# Patient Record
Sex: FEMALE | Race: BLACK OR AFRICAN AMERICAN | Employment: FULL TIME | URBAN - METROPOLITAN AREA
[De-identification: names, ages, dates, MRNs, and addresses within clinical notes are randomized per-mention and may not be internally consistent; named-entity substitution may affect disease eponyms.]

---

## 2020-03-13 ENCOUNTER — OFFICE VISIT (OUTPATIENT)
Dept: FAMILY MEDICINE CLINIC | Age: 30
End: 2020-03-13

## 2020-03-13 VITALS
BODY MASS INDEX: 19.92 KG/M2 | HEIGHT: 67 IN | HEART RATE: 79 BPM | RESPIRATION RATE: 20 BRPM | TEMPERATURE: 98.1 F | WEIGHT: 126.9 LBS | OXYGEN SATURATION: 98 % | SYSTOLIC BLOOD PRESSURE: 110 MMHG | DIASTOLIC BLOOD PRESSURE: 70 MMHG

## 2020-03-13 DIAGNOSIS — R00.2 HEART PALPITATIONS: ICD-10-CM

## 2020-03-13 DIAGNOSIS — E78.00 HYPERCHOLESTEROLEMIA: ICD-10-CM

## 2020-03-13 DIAGNOSIS — Z00.00 WELL WOMAN EXAM (NO GYNECOLOGICAL EXAM): Primary | ICD-10-CM

## 2020-03-13 NOTE — PROGRESS NOTES
Subjective:   30 y.o. female for Well Woman Check. Her gyne and breast care is done elsewhere by her Ob-Gyne physician. Current Outpatient Medications   Medication Sig Dispense Refill    OTHER Birth Control Pills patient unsure of name-dose-.      norethindrone-ethinyl estradiol (CYCLAFEM 1/35, 28,) 1-35 mg-mcg per tablet Take  by mouth. No Known Allergies  History reviewed. No pertinent past medical history. Past Surgical History:   Procedure Laterality Date    HX CORNEAL TRANSPLANT      HX GYN  2009    D&C 2009    HX HEENT  [id number]    corneal transplant (3)  2005     Family History   Problem Relation Age of Onset    Cancer Maternal Grandmother     Cancer Maternal Grandfather      Social History     Tobacco Use    Smoking status: Never Smoker    Smokeless tobacco: Never Used   Substance Use Topics    Alcohol use: Yes     Comment: rarely        Lab Results   Component Value Date/Time    WBC 6.5 01/10/2014 01:14 PM    HGB 13.1 01/10/2014 01:14 PM    HCT 39.3 01/10/2014 01:14 PM    PLATELET 708 15/68/1053 01:14 PM    MCV 93 01/10/2014 01:14 PM     Lab Results   Component Value Date/Time    Glucose 74 01/10/2014 01:14 PM    LDL, calculated 107 (H) 01/10/2014 01:14 PM    Creatinine 0.97 01/10/2014 01:14 PM      Lab Results   Component Value Date/Time    Cholesterol, total 188 01/10/2014 01:14 PM    HDL Cholesterol 71 01/10/2014 01:14 PM    LDL, calculated 107 (H) 01/10/2014 01:14 PM    Triglyceride 48 01/10/2014 01:14 PM            Constitutional: no chills and fever,   nad     HENT: no ear head pain and nosebleeds. No blurred vision, pain and discharge. Respiratory: no shortness of breath, wheezing cough nor sore throat. Cardiovascular: Has no chest pain, leg swelling , w/+++ racing heart . Gastrointestinal: No constipation, diarrhea, nausea and vomiting. Genitourinary: No frequency. Musculoskeletal: Negative for joint pain. Skin: no itching, pimples or acne rash. Neurological: Negative for dizziness, no tremors  Psychiatric/Behavioral: Negative for depression has normal interest to do things and not depressed the patient is not nervous/anxious. Specific concerns today:   Palaptiation, no hx of it last few sec, goes away not dizzy no hx of heart dz     Objective: The patient appears well, alert, oriented x 3, in no distress. Visit Vitals  /70 (BP 1 Location: Left arm, BP Patient Position: At rest)   Pulse 79   Temp 98.1 °F (36.7 °C) (Oral)   Resp 20   Ht 5' 7.13\" (1.705 m)   Wt 126 lb 14.4 oz (57.6 kg)   LMP 02/13/2020   SpO2 98%   BMI 19.80 kg/m²     ENT normal.  Neck supple. No adenopathy or thyromegaly. OMI. Lungs are clear, good air entry, no wheezes, rhonchi or rales. S1 and S2 normal, no murmurs, regular rate and rhythm. Abdomen soft without tenderness, guarding, mass or organomegaly. Extremities show no edema, normal peripheral pulses. Neurological is normal, no focal findings. Breast and Pelvic exams are deferred. Assessment/Plan:   Well Woman  lose weight, increase physical activity, follow low fat diet, follow low salt diet, routine labs ordered  Diagnoses and all orders for this visit:    1. Well woman exam (no gynecological exam)  Comments:  [V70.0]  Orders:  -     REFERRAL TO CARDIOLOGY  -     CBC W/O DIFF  -     METABOLIC PANEL, COMPREHENSIVE  -     VITAMIN D, 25 HYDROXY  -     VITAMIN B12 & FOLATE  -     TSH 3RD GENERATION  -     FERRITIN  -     AMB POC URINALYSIS DIP STICK AUTO W/O MICRO  -     HDL CHOLESTEROL  -     CHOLESTEROL, TOTAL    2. Heart palpitations  -     REFERRAL TO CARDIOLOGY    3.  Hypercholesterolemia  -     TSH 3RD GENERATION  -     HDL CHOLESTEROL  -     CHOLESTEROL, TOTAL

## 2020-03-16 ENCOUNTER — LAB ONLY (OUTPATIENT)
Dept: FAMILY MEDICINE CLINIC | Age: 30
End: 2020-03-16

## 2020-03-16 LAB
BILIRUB UR QL STRIP: NEGATIVE
GLUCOSE UR-MCNC: NEGATIVE MG/DL
KETONES P FAST UR STRIP-MCNC: NEGATIVE MG/DL
PH UR STRIP: 7 [PH] (ref 4.6–8)
PROT UR QL STRIP: NEGATIVE
SP GR UR STRIP: 1.01 (ref 1–1.03)
UA UROBILINOGEN AMB POC: NORMAL (ref 0.2–1)
URINALYSIS CLARITY POC: CLEAR
URINALYSIS COLOR POC: YELLOW
URINE BLOOD POC: NEGATIVE
URINE LEUKOCYTES POC: NORMAL
URINE NITRITES POC: NEGATIVE

## 2020-03-18 LAB
25(OH)D3+25(OH)D2 SERPL-MCNC: 19.8 NG/ML (ref 30–100)
ALBUMIN SERPL-MCNC: 4.6 G/DL (ref 3.8–4.8)
ALBUMIN/GLOB SERPL: 1.5 {RATIO} (ref 1.2–2.2)
ALP SERPL-CCNC: 38 IU/L (ref 39–117)
ALT SERPL-CCNC: 13 IU/L (ref 0–32)
AST SERPL-CCNC: 20 IU/L (ref 0–40)
BILIRUB SERPL-MCNC: 0.5 MG/DL (ref 0–1.2)
BUN SERPL-MCNC: 8 MG/DL (ref 6–20)
BUN/CREAT SERPL: 9 (ref 9–23)
CALCIUM SERPL-MCNC: 9.6 MG/DL (ref 8.7–10.2)
CHLORIDE SERPL-SCNC: 101 MMOL/L (ref 96–106)
CHOLEST SERPL-MCNC: 191 MG/DL (ref 100–199)
CO2 SERPL-SCNC: 22 MMOL/L (ref 20–29)
CREAT SERPL-MCNC: 0.85 MG/DL (ref 0.57–1)
ERYTHROCYTE [DISTWIDTH] IN BLOOD BY AUTOMATED COUNT: 12 % (ref 11.7–15.4)
FERRITIN SERPL-MCNC: 88 NG/ML (ref 15–150)
FOLATE SERPL-MCNC: 10 NG/ML
GLOBULIN SER CALC-MCNC: 3 G/DL (ref 1.5–4.5)
GLUCOSE SERPL-MCNC: 79 MG/DL (ref 65–99)
HCT VFR BLD AUTO: 40.3 % (ref 34–46.6)
HDLC SERPL-MCNC: 80 MG/DL
HGB BLD-MCNC: 13.6 G/DL (ref 11.1–15.9)
MCH RBC QN AUTO: 29.8 PG (ref 26.6–33)
MCHC RBC AUTO-ENTMCNC: 33.7 G/DL (ref 31.5–35.7)
MCV RBC AUTO: 88 FL (ref 79–97)
PLATELET # BLD AUTO: 386 X10E3/UL (ref 150–450)
POTASSIUM SERPL-SCNC: 4 MMOL/L (ref 3.5–5.2)
PROT SERPL-MCNC: 7.6 G/DL (ref 6–8.5)
RBC # BLD AUTO: 4.56 X10E6/UL (ref 3.77–5.28)
SODIUM SERPL-SCNC: 139 MMOL/L (ref 134–144)
TSH SERPL DL<=0.005 MIU/L-ACNC: 0.68 UIU/ML (ref 0.45–4.5)
VIT B12 SERPL-MCNC: 303 PG/ML (ref 232–1245)
WBC # BLD AUTO: 7 X10E3/UL (ref 3.4–10.8)

## 2020-03-27 ENCOUNTER — OFFICE VISIT (OUTPATIENT)
Dept: CARDIOLOGY CLINIC | Age: 30
End: 2020-03-27

## 2020-03-27 ENCOUNTER — CLINICAL SUPPORT (OUTPATIENT)
Dept: CARDIOLOGY CLINIC | Age: 30
End: 2020-03-27

## 2020-03-27 VITALS
WEIGHT: 130.8 LBS | HEIGHT: 67 IN | SYSTOLIC BLOOD PRESSURE: 120 MMHG | OXYGEN SATURATION: 97 % | RESPIRATION RATE: 18 BRPM | DIASTOLIC BLOOD PRESSURE: 80 MMHG | HEART RATE: 72 BPM | BODY MASS INDEX: 20.53 KG/M2

## 2020-03-27 DIAGNOSIS — R00.2 PALPITATIONS: Primary | ICD-10-CM

## 2020-03-27 DIAGNOSIS — R00.2 PALPITATIONS: ICD-10-CM

## 2020-03-27 RX ORDER — NORGESTIMATE AND ETHINYL ESTRADIOL 0.25-0.035
1 KIT ORAL DAILY
COMMUNITY
Start: 2020-01-03 | End: 2021-11-02 | Stop reason: ALTCHOICE

## 2020-03-27 NOTE — PROGRESS NOTES
Chief Complaint   Patient presents with    New Patient     referred by PCP due to heart palpitations     1. Have you been to the ER, urgent care clinic since your last visit? Hospitalized since your last visit? No  2. Have you seen or consulted any other health care providers outside of the [address] [place] [address] since your last visit? Include any pap smears or colon screening.  NO

## 2020-03-27 NOTE — PROGRESS NOTES
[facility] @ [address] [facility], [place]  Subjective/HPI:     Paulina Contreras is a 30 y.o. female is here as a new patient symptom based appointment referred by her primary care physician Dr. Mone Gale. She reports since January having intermittent episodes of palpitations without any specific triggers, she reports that they occur mainly during daytime hours denies waking up with skipped heartbeats or tachycardia. She is a non-smoker, minimal caffeine intake, no over-the-counter stimulants or energy boost medications or supplements. There is no symptoms suggestive of obstructive sleep apnea. There is no family history of heart disease or arrhythmias. Recent lab work performed by primary care noting normal thyroid, CBC and electrolytes. B12 was normal, total cholesterol in normal range. She reports the palpitations are non-daily in fact they can occur more than 4 days apart. They last less than 10 seconds and denies associated lightheadedness dizziness syncope or presyncopal feelings. PCP Provider  Danuta Villatoro MD  History reviewed. No pertinent past medical history. Past Surgical History:   Procedure Laterality Date    HX CORNEAL TRANSPLANT      HX GYN  2009    D&C 2009    HX HEENT  2006,2007,2008    corneal transplant (3)  2005     No Known Allergies   Family History   Problem Relation Age of Onset    Cancer Maternal Grandmother     Cancer Maternal Grandfather       Current Outpatient Medications   Medication Sig    Sprintec, 28, 0.25-35 mg-mcg tab Take 1 Tab by mouth daily.  norethindrone-ethinyl estradiol (CYCLAFEM 1/35, 28,) 1-35 mg-mcg per tablet Take  by mouth.  OTHER Birth Control Pills patient unsure of name-dose-. No current facility-administered medications for this visit.        Vitals:    03/27/20 1529 03/27/20 1535   BP: 110/84 120/80   Pulse: 72    Resp: 18    SpO2: 97%    Weight: 130 lb 12.8 oz (59.3 kg)    Height: 5' 7.13\" (1.705 m)      Social History     Socioeconomic History    Marital status:      Spouse name: Not on file    Number of children: Not on file    Years of education: Not on file    Highest education level: Not on file   Occupational History    Not on file   Social Needs    Financial resource strain: Not on file    Food insecurity     Worry: Not on file     Inability: Not on file    Transportation needs     Medical: Not on file     Non-medical: Not on file   Tobacco Use    Smoking status: Never Smoker    Smokeless tobacco: Never Used   Substance and Sexual Activity    Alcohol use: Yes     Comment: rarely    Drug use: No    Sexual activity: Yes     Partners: Male     Birth control/protection: Pill   Lifestyle    Physical activity     Days per week: Not on file     Minutes per session: Not on file    Stress: Not on file   Relationships    Social connections     Talks on phone: Not on file     Gets together: Not on file     Attends Restoration service: Not on file     Active member of club or organization: Not on file     Attends meetings of clubs or organizations: Not on file     Relationship status: Not on file    Intimate partner violence     Fear of current or ex partner: Not on file     Emotionally abused: Not on file     Physically abused: Not on file     Forced sexual activity: Not on file   Other Topics Concern    Not on file   Social History Narrative    Not on file       I have reviewed the nurses notes, vitals, problem list, allergy list, medical history, family, social history and medications. Review of Symptoms  11 systems reviewed, negative other than as stated in the HPI      Physical Exam:      General: Well developed, in no acute distress, cooperative and alert  HEENT: No carotid bruits, no JVD, trach is midline. Neck Supple, PERRL, EOM intact. Heart:  Normal S1/S2 negative S3 or S4. Regular, no murmur, gallop or rub.    Respiratory: Clear bilaterally x 4, no wheezing or rales  Abdomen:   Soft, non-tender, no masses, bowel sounds are active. Extremities:  No edema, normal cap refill, no cyanosis, atraumatic. Neuro: A&Ox3, speech clear, gait stable. Skin: Skin color is normal. No rashes or lesions. Non diaphoretic  Vascular: 2+ pulses symmetric in all extremities    Cardiographics    ECG: Normal sinus rhythm        Cardiology Labs:  Lab Results   Component Value Date/Time    Cholesterol, total 191 03/16/2020 04:35 PM    HDL Cholesterol 80 03/16/2020 04:35 PM    LDL, calculated 107 (H) 01/10/2014 01:14 PM    Triglyceride 48 01/10/2014 01:14 PM       Lab Results   Component Value Date/Time    Sodium 139 03/16/2020 04:35 PM    Potassium 4.0 03/16/2020 04:35 PM    Chloride 101 03/16/2020 04:35 PM    CO2 22 03/16/2020 04:35 PM    Glucose 79 03/16/2020 04:35 PM    BUN 8 03/16/2020 04:35 PM    Creatinine 0.85 03/16/2020 04:35 PM    BUN/Creatinine ratio 9 03/16/2020 04:35 PM    GFR est  03/16/2020 04:35 PM    GFR est non-AA 92 03/16/2020 04:35 PM    Calcium 9.6 03/16/2020 04:35 PM    Bilirubin, total 0.5 03/16/2020 04:35 PM    AST (SGOT) 20 03/16/2020 04:35 PM    Alk. phosphatase 38 (L) 03/16/2020 04:35 PM    Protein, total 7.6 03/16/2020 04:35 PM    Albumin 4.6 03/16/2020 04:35 PM    A-G Ratio 1.5 03/16/2020 04:35 PM    ALT (SGPT) 13 03/16/2020 04:35 PM           Assessment:     Assessment:     Diagnoses and all orders for this visit:    1. Palpitations  -     AMB POC EKG ROUTINE W/ 12 LEADS, INTER & REP        ICD-10-CM ICD-9-CM    1. Palpitations R00.2 785.1 AMB POC EKG ROUTINE W/ 12 LEADS, INTER & REP     Orders Placed This Encounter    AMB POC EKG ROUTINE W/ 12 LEADS, INTER & REP     Order Specific Question:   Reason for Exam:     Answer:   routine    Sprintec, 28, 0.25-35 mg-mcg tab     Sig: Take 1 Tab by mouth daily.         Plan:     Patient is a 33-year-old female with intermittent non-daily palpitations that can occur more than 4 days apart lasting upwards of 10 seconds. On exam today her EKG is normal, lab work including thyroid electrolytes and CBC are normal.  Will place 4-week remote event monitor from [employer] on patient today as she is in clinic. The differential diagnoses paroxysmal SVT versus symptomatic PVCs/PACs. No significant murmur on exam would benefit from an echocardiogram however I feel this can be done at a later date and will schedule 3 months from now. I will follow-up with patient by phone if normal monitor, if any further evaluation or discussion for medication treatment is necessary will perform via virtual visit. Riley Daigle NP      Please note that this dictation was completed with [employer], the computer voice recognition software. Quite often unanticipated grammatical, syntax, homophones, and other interpretive errors are inadvertently transcribed by the computer software. Please disregard these errors. Please excuse any errors that have escaped final proofreading. Thank you.

## 2020-04-17 LAB
LDLC SERPL DIRECT ASSAY-MCNC: 118 MG/DL (ref 0–99)
TRIGL SERPL-MCNC: 55 MG/DL (ref 0–149)

## 2020-04-20 ENCOUNTER — PATIENT MESSAGE (OUTPATIENT)
Dept: FAMILY MEDICINE CLINIC | Age: 30
End: 2020-04-20

## 2020-04-21 RX ORDER — ERGOCALCIFEROL 1.25 MG/1
50000 CAPSULE ORAL
Qty: 12 CAP | Refills: 1 | Status: SHIPPED | OUTPATIENT
Start: 2020-04-21 | End: 2021-06-04

## 2020-04-27 NOTE — PROGRESS NOTES
Patient received a 30 day event monitor. Instructions given verbally as well as an instruction sheet. Pt verbalized understanding.     [facility] Event Monitoring

## 2020-04-29 NOTE — PROGRESS NOTES
Juan David: Please call patient event monitor shows she has some premature ventricular contractions, no runs of arrhythmias. I would like to explain her treatment options with her and what she would like done. We can do this via virtual visit, I ordered an echocardiogram for her, this can be moved up to May as the schedule for testing is now opened up. The virtual visit would do better once we have the results of the echocardiogram and can discuss everything at once.

## 2020-04-30 NOTE — PROGRESS NOTES
Spoke with patient  Verified patient with 2 patient identifiers  Informed per Romel Lopez NP event monitor shows she has some premature ventricular contractions, no runs of arrhythmias. He would like to explain her treatment options with her and what she would like done via virtual visit after ECHO which will have moved up to May. Patient verbalized understanding.

## 2020-05-26 ENCOUNTER — HOSPITAL ENCOUNTER (OUTPATIENT)
Dept: NON INVASIVE DIAGNOSTICS | Age: 30
Discharge: HOME | End: 2020-05-26
Attending: NURSE PRACTITIONER
Payer: COMMERCIAL

## 2020-05-26 DIAGNOSIS — R00.2 PALPITATIONS: ICD-10-CM

## 2020-05-26 PROCEDURE — 93306 TTE W/DOPPLER COMPLETE: CPT

## 2020-05-27 LAB
ECHO AO ROOT DIAM: 2.33 CM
ECHO AV AREA PLAN: 3.3 CM2
ECHO EST RA PRESSURE: 5 MMHG
ECHO LA AREA 4C: 12.9 CM2
ECHO LA MAJOR AXIS: 2.54 CM
ECHO LA TO AORTIC ROOT RATIO: 1.09
ECHO LA VOL 4C: 28.12 ML (ref 22–52)
ECHO LV EDV A4C: 90.2 ML
ECHO LV EDV TEICHHOLZ: 0.52 ML
ECHO LV EJECTION FRACTION A4C: 65 %
ECHO LV ESV A4C: 31.6 ML
ECHO LV ESV TEICHHOLZ: 0.21 ML
ECHO LV INTERNAL DIMENSION DIASTOLIC: 4.43 CM (ref 3.9–5.3)
ECHO LV INTERNAL DIMENSION SYSTOLIC: 3.01 CM
ECHO LV IVSD: 0.57 CM (ref 0.6–0.9)
ECHO LV MASS 2D: 99.3 G (ref 67–162)
ECHO LV POSTERIOR WALL DIASTOLIC: 0.81 CM (ref 0.6–0.9)
ECHO LVOT DIAM: 1.91 CM
ECHO LVOT PEAK GRADIENT: 2.2 MMHG
ECHO LVOT PEAK VELOCITY: 74.83 CM/S
ECHO MV A VELOCITY: 31.57 CM/S
ECHO MV AREA PLAN: 6.6 CM2
ECHO MV E DECELERATION TIME (DT): 78 MS
ECHO MV E VELOCITY: 39.57 CM/S
ECHO MV E/A RATIO: 1.25
ECHO MV MAX VELOCITY: 60.56 CM/S
ECHO MV MEAN GRADIENT: 0.5 MMHG
ECHO MV MEAN INFLOW VELOCITY: 0.32 M/S
ECHO MV PEAK GRADIENT: 1.5 MMHG
ECHO MV VTI: 13.1 CM
ECHO PULMONARY ARTERY SYSTOLIC PRESSURE (PASP): 21.4 MMHG
ECHO PV MAX VELOCITY: 69.71 CM/S
ECHO PV PEAK GRADIENT: 1.9 MMHG
ECHO PV REGURGITANT MAX VELOCITY: 155.46 CM/S
ECHO RA AREA 4C: 10.8 CM2
ECHO RIGHT VENTRICULAR SYSTOLIC PRESSURE (RVSP): 21.4 MMHG
ECHO TV REGURGITANT MAX VELOCITY: 202.59 CM/S
ECHO TV REGURGITANT PEAK GRADIENT: 16.4 MMHG
LVFS 2D: 31.95 %
LVSV (MOD SINGLE 4C): 35.09 ML
LVSV (TEICH): 32.1 ML
MV DEC SLOPE: 5.14

## 2020-05-27 NOTE — PROGRESS NOTES
Juan David: Please call patient echocardiogram looks good, schedule patient a virtual visit with me so we could go over her test results reevaluate her symptoms etc.

## 2020-05-28 NOTE — PROGRESS NOTES
Spoke with patient  Verified patient with 2 patient identifiers  Informed per Dara Townsend NP ECHO looks good, need virtual to reevaluate symptoms. Patient verbalized understanding.

## 2020-06-03 ENCOUNTER — OFFICE VISIT (OUTPATIENT)
Dept: CARDIOLOGY CLINIC | Age: 30
End: 2020-06-03

## 2020-06-03 VITALS — HEIGHT: 67 IN | BODY MASS INDEX: 20.4 KG/M2 | WEIGHT: 130 LBS

## 2020-06-03 DIAGNOSIS — I07.1 MILD TRICUSPID REGURGITATION: ICD-10-CM

## 2020-06-03 DIAGNOSIS — I49.3 PVC'S (PREMATURE VENTRICULAR CONTRACTIONS): Primary | Chronic | ICD-10-CM

## 2020-06-03 RX ORDER — ATENOLOL 25 MG/1
12.5 TABLET ORAL
Qty: 30 TAB | Refills: 1 | Status: SHIPPED | OUTPATIENT
Start: 2020-06-03 | End: 2020-06-16

## 2020-06-03 RX ORDER — PREDNISOLONE ACETATE 10 MG/ML
SUSPENSION/ DROPS OPHTHALMIC
COMMUNITY
Start: 2020-05-29 | End: 2021-06-04

## 2020-06-03 NOTE — PROGRESS NOTES
[facility]                                                                                  Thomas Ha DNP, [facility]    Cardiology Telemedicine Encounter                                                         Pursuant to the emergency declaration under the [address] [facility] waiver authority and the [employer] Act, this Virtual  Visit was conducted, with patient's consent, to reduce the patient's risk of exposure to COVID-19 and provide continuity of care for an established patient. Services were provided through a video synchronous discussion virtually to substitute for in-person clinic visit. Subjective/HPI:   Justyn Dimas is a 30 y.o. female who was seen by synchronous (real-time) audio-video technology on 6/3/2020. Patient reports since last visit frequency and intensity of palpitations have reduced, she finds that caffeine is a probable trigger. Plan from previous visit:   Plan:      Patient is a 17-year-old female with intermittent non-daily palpitations that can occur more than 4 days apart lasting upwards of 10 seconds. On exam today her EKG is normal, lab work including thyroid electrolytes and CBC are normal.  Will place 4-week remote event monitor from [employer] on patient today as she is in clinic. The differential diagnoses paroxysmal SVT versus symptomatic PVCs/PACs. No significant murmur on exam would benefit from an echocardiogram however I feel this can be done at a later date and will schedule 3 months from now. I will follow-up with patient by phone if normal monitor, if any further evaluation or discussion for medication treatment is necessary will perform via virtual visit.      [employer] Event monitor:  ECG RECORDING, XMIT/REVIEW/INTRPRET    Reason for Event Monitor  PALPITATIONS    Monitoring period 1 Month    Events Recorded 9      Results  Arrhthymias:premature ventricular contractions    Symptom Correlation:   None reported    Comments:   Sinus rhythm throughout        2D ECHO 5/2020  Echo Findings     Left Ventricle Normal cavity size, wall thickness and systolic function (ejection fraction normal). The estimated ejection fraction is 55 - 60%. LV wall motion is noted to be no regional wall motion abnormality. Normal left ventricular strain. Left Atrium Normal cavity size. Right Ventricle Normal cavity size and global systolic function. Right Atrium Normal cavity size. Aortic Valve Normal valve structure, no stenosis and no regurgitation. Mitral Valve Normal valve structure, no stenosis and no regurgitation. Tricuspid Valve Normal valve structure and no stenosis. Mild regurgitation. Pulmonic Valve Pulmonic valve not well visualized. No stenosis. Mild regurgitation. Aorta Normal aortic root. Pericardium No evidence of pericardial effusion. PCP Provider  Bernice Daniel MD  No past medical history on file. Past Surgical History:   Procedure Laterality Date    HX CORNEAL TRANSPLANT      HX GYN  2009    D&C 2009    HX HEENT  2006,2007,2008    corneal transplant (3)  2005     No Known Allergies   Family History   Problem Relation Age of Onset    Cancer Maternal Grandmother     Cancer Maternal Grandfather       Current Outpatient Medications   Medication Sig    ergocalciferol (ERGOCALCIFEROL) 1,250 mcg (50,000 unit) capsule Take 1 Cap by mouth every seven (7) days.  Sprintec, 28, 0.25-35 mg-mcg tab Take 1 Tab by mouth daily.  norethindrone-ethinyl estradiol (CYCLAFEM 1/35, 28,) 1-35 mg-mcg per tablet Take  by mouth.  OTHER Birth Control Pills patient unsure of name-dose-. No current facility-administered medications for this visit. There were no vitals filed for this visit.   Social History     Socioeconomic History    Marital status:      Spouse name: Not on file    Number of children: Not on file    Years of education: Not on file    Highest education level: Not on file   Occupational History    Not on file   Social Needs    Financial resource strain: Not on file    Food insecurity     Worry: Not on file     Inability: Not on file    Transportation needs     Medical: Not on file     Non-medical: Not on file   Tobacco Use    Smoking status: Never Smoker    Smokeless tobacco: Never Used   Substance and Sexual Activity    Alcohol use: Yes     Comment: rarely    Drug use: No    Sexual activity: Yes     Partners: Male     Birth control/protection: Pill   Lifestyle    Physical activity     Days per week: Not on file     Minutes per session: Not on file    Stress: Not on file   Relationships    Social connections     Talks on phone: Not on file     Gets together: Not on file     Attends Jew service: Not on file     Active member of club or organization: Not on file     Attends meetings of clubs or organizations: Not on file     Relationship status: Not on file    Intimate partner violence     Fear of current or ex partner: Not on file     Emotionally abused: Not on file     Physically abused: Not on file     Forced sexual activity: Not on file   Other Topics Concern    Not on file   Social History Narrative    Not on file       Review of Symptoms  11 systems reviewed, negative other than as stated in the HPI    Physical Exam:    Due to this being a TeleHealth evaluation, many elements of the physical examination are unable to be assessed. General: Well developed, in no acute distress, cooperative and alert  HEENT: Pupils equal/round. No marked JVD visible on video. Respiratory: No audible wheezing, no signs of respiratory distress, lips non cyanotic  Extremities:  No edema  Neuro: A&Ox3, speech clear, answering questions appropriately  Skin: Skin color is normal. No rashes or lesions.  Non diaphoretic on visible skin during exam      Cardiology Labs:  Lab Results   Component Value Date/Time Cholesterol, total 191 03/16/2020 04:35 PM    HDL Cholesterol 80 03/16/2020 04:35 PM    LDL,Direct 118 (H) 04/16/2020 08:04 AM    LDL, calculated 107 (H) 01/10/2014 01:14 PM    Triglyceride 55 04/16/2020 08:04 AM       Lab Results   Component Value Date/Time    Sodium 139 03/16/2020 04:35 PM    Potassium 4.0 03/16/2020 04:35 PM    Chloride 101 03/16/2020 04:35 PM    CO2 22 03/16/2020 04:35 PM    Glucose 79 03/16/2020 04:35 PM    BUN 8 03/16/2020 04:35 PM    Creatinine 0.85 03/16/2020 04:35 PM    BUN/Creatinine ratio 9 03/16/2020 04:35 PM    GFR est  03/16/2020 04:35 PM    GFR est non-AA 92 03/16/2020 04:35 PM    Calcium 9.6 03/16/2020 04:35 PM    Bilirubin, total 0.5 03/16/2020 04:35 PM    Alk. phosphatase 38 (L) 03/16/2020 04:35 PM    Protein, total 7.6 03/16/2020 04:35 PM    Albumin 4.6 03/16/2020 04:35 PM    A-G Ratio 1.5 03/16/2020 04:35 PM    ALT (SGPT) 13 03/16/2020 04:35 PM         Assessment:     Diagnoses and all orders for this visit:    1. PVC's (premature ventricular contractions)    2. Mild tricuspid regurgitation        ICD-10-CM ICD-9-CM    1. PVC's (premature ventricular contractions) I49.3 427.69    2. Mild tricuspid regurgitation I07.1 397.0      No orders of the defined types were placed in this encounter. Plan:     1. Premature ventricular contractions: Discussed with limitation/avoidance of caffeine as well as other stimulants and stress. Prescribed low-dose as needed atenolol for days where the frequency and symptoms of PVCs are increased. Stable from cardiac perspective follow-up in 1 year. We discussed the expected course, resolution and complications of the diagnosis(es) in detail. Medication risks, benefits, costs, interactions, and alternatives were discussed as indicated. I advised her to contact the office if her condition worsens, changes or fails to improve as anticipated.  She expressed understanding with the diagnosis(es) and plan  Bartolo Parr NP    Greater than 20 minutes was spent in direct video patient care, planning and chart review. This visit was conducted using Apple FaceTime with provider device connected to hospital-based encrypted  with provider located onsite at [facility].  Patient was located in her home state of [place].

## 2020-06-03 NOTE — PROGRESS NOTES
Chief Complaint   Patient presents with    Results     VV via  [employer] [phone],IBSF and heart monitor results. Last BP taken 2 days ago at [facility] was 122/70      1. Have you been to the ER, urgent care clinic since your last visit? Hospitalized since your last visit? No    2. Have you seen or consulted any other health care providers outside of the [address] [place] [address] since your last visit? Include any pap smears or colon screening.  No

## 2020-06-16 RX ORDER — ATENOLOL 25 MG/1
12.5 TABLET ORAL
Qty: 30 TAB | Refills: 1 | Status: SHIPPED | OUTPATIENT
Start: 2020-06-16 | End: 2020-09-11

## 2020-09-11 RX ORDER — ATENOLOL 25 MG/1
12.5 TABLET ORAL
Qty: 45 TAB | Refills: 1 | Status: SHIPPED | OUTPATIENT
Start: 2020-09-11 | End: 2021-03-17

## 2021-03-17 RX ORDER — ATENOLOL 25 MG/1
12.5 TABLET ORAL
Qty: 45 TAB | Refills: 1 | Status: SHIPPED | OUTPATIENT
Start: 2021-03-17 | End: 2021-11-02 | Stop reason: ALTCHOICE

## 2021-06-03 PROBLEM — I49.3 PVC (PREMATURE VENTRICULAR CONTRACTION): Status: ACTIVE | Noted: 2021-06-03

## 2021-06-03 NOTE — PROGRESS NOTES
[facility] @ / [place] , [place]  Subjective/HPI:     Yesi Roberts is a 26 y.o. female with a history of intermittent symptomatic premature ventricular contractions is here for yearly routine f/u. The patient denies chest pain/ shortness of breath, orthopnea, PND, LE edema,  syncope, presyncope or fatigue. Patient reports since she had discontinued caffeine her symptoms had resolved, she has slowly reintroduced smaller amounts of caffeinated soda without return of palpitations. She has not required the use of beta-blocker therapy. Normal amounts of stress, no dietary changes no over-the-counter stimulants. 6/2020 Visit  1. Premature ventricular contractions: Discussed with limitation/avoidance of caffeine as well as other stimulants and stress. Prescribed low-dose as needed atenolol for days where the frequency and symptoms of PVCs are increased. Stable from cardiac perspective follow-up in 1 year.     [employer] Event monitor:  ECG RECORDING, XMIT/REVIEW/INTRPRET    Reason for Event Monitor  PALPITATIONS    Monitoring period 1 Month    Events Recorded 9      Results  Arrhthymias:premature ventricular contractions    Symptom Correlation:   None reported    Comments:   Sinus rhythm throughout          2D ECHO 5/2020  Echo Findings      Left Ventricle Normal cavity size, wall thickness and systolic function (ejection fraction normal). The estimated ejection fraction is 55 - 60%. LV wall motion is noted to be no regional wall motion abnormality. Normal left ventricular strain. Left Atrium Normal cavity size. Right Ventricle Normal cavity size and global systolic function. Right Atrium Normal cavity size. Aortic Valve Normal valve structure, no stenosis and no regurgitation. Mitral Valve Normal valve structure, no stenosis and no regurgitation. Tricuspid Valve Normal valve structure and no stenosis. Mild regurgitation.    Pulmonic Valve Pulmonic valve not well visualized. No stenosis. Mild regurgitation. Aorta Normal aortic root. Pericardium No evidence of pericardial effusion.           PCP Provider  Kori Rowell MD  Past Medical History:   Diagnosis Date    PVC (premature ventricular contraction) 6/3/2021      Past Surgical History:   Procedure Laterality Date    HX CORNEAL TRANSPLANT      HX GYN  2009    D&C 2009    HX HEENT  2006,2007,2008    corneal transplant (3)  2005     No Known Allergies   Family History   Problem Relation Age of Onset    Cancer Maternal Grandmother     Cancer Maternal Grandfather       Current Outpatient Medications   Medication Sig    atenoloL (TENORMIN) 25 mg tablet TAKE 0.5 TABS BY MOUTH DAILY AS NEEDED (PALPITATION).  prednisoLONE acetate (PRED FORTE) 1 % ophthalmic suspension     ergocalciferol (ERGOCALCIFEROL) 1,250 mcg (50,000 unit) capsule Take 1 Cap by mouth every seven (7) days.  Sprintec, 28, 0.25-35 mg-mcg tab Take 1 Tab by mouth daily.  norethindrone-ethinyl estradiol (CYCLAFEM 1/35, 28,) 1-35 mg-mcg per tablet Take  by mouth.  OTHER Birth Control Pills patient unsure of name-dose-. No current facility-administered medications for this visit. There were no vitals filed for this visit.   Social History     Socioeconomic History    Marital status:      Spouse name: Not on file    Number of children: Not on file    Years of education: Not on file    Highest education level: Not on file   Occupational History    Not on file   Tobacco Use    Smoking status: Never Smoker    Smokeless tobacco: Never Used   Substance and Sexual Activity    Alcohol use: Yes     Comment: rarely    Drug use: No    Sexual activity: Yes     Partners: Male     Birth control/protection: Pill   Other Topics Concern    Not on file   Social History Narrative    Not on file     Social Determinants of Health     Financial Resource Strain:     Difficulty of Paying Living Expenses:    Food Insecurity:     Worried About Running Out of Food in the Last Year:     [address] in the Last Year:    Transportation Needs:     Lack of Transportation (Medical):  Lack of Transportation (Non-Medical):    Physical Activity:     Days of Exercise per Week:     Minutes of Exercise per Session:    Stress:     Feeling of Stress :    Social Connections:     Frequency of Communication with Friends and Family:     Frequency of Social Gatherings with Friends and Family:     Attends Islam Services:     Active Member of Clubs or Organizations:     Attends Club or Organization Meetings:     Marital Status:    Intimate Partner Violence:     Fear of Current or Ex-Partner:     Emotionally Abused:     Physically Abused:     Sexually Abused:        I have reviewed the nurses notes, vitals, problem list, allergy list, medical history, family, social history and medications. Review of Symptoms  11 systems reviewed, negative other than as stated in the HPI      Physical Exam:      General: Well developed, in no acute distress, cooperative and alert  HEENT: No carotid bruits, no JVD, trach is midline. Neck Supple, PERRL, EOM intact. Heart:  Normal S1/S2 negative S3 or S4. Regular, no murmur, gallop or rub. Respiratory: Clear bilaterally x 4, no wheezing or rales  Abdomen:   Soft, non-tender, no masses, bowel sounds are active. Extremities:  No edema, normal cap refill, no cyanosis, atraumatic. Neuro: A&Ox3, speech clear, gait stable. Skin: Skin color is normal. No rashes or lesions.  Non diaphoretic  Vascular: 2+ pulses symmetric in all extremities    Cardiographics    ECG: Normal sinus rhythm        Cardiology Labs:  Lab Results   Component Value Date/Time    Cholesterol, total 191 03/16/2020 04:35 PM    HDL Cholesterol 80 03/16/2020 04:35 PM    LDL,Direct 118 (H) 04/16/2020 08:04 AM    LDL, calculated 107 (H) 01/10/2014 01:14 PM    Triglyceride 55 04/16/2020 08:04 AM       Lab Results Component Value Date/Time    Sodium 139 03/16/2020 04:35 PM    Potassium 4.0 03/16/2020 04:35 PM    Chloride 101 03/16/2020 04:35 PM    CO2 22 03/16/2020 04:35 PM    Glucose 79 03/16/2020 04:35 PM    BUN 8 03/16/2020 04:35 PM    Creatinine 0.85 03/16/2020 04:35 PM    BUN/Creatinine ratio 9 03/16/2020 04:35 PM    GFR est  03/16/2020 04:35 PM    GFR est non-AA 92 03/16/2020 04:35 PM    Calcium 9.6 03/16/2020 04:35 PM    Bilirubin, total 0.5 03/16/2020 04:35 PM    Alk. phosphatase 38 (L) 03/16/2020 04:35 PM    Protein, total 7.6 03/16/2020 04:35 PM    Albumin 4.6 03/16/2020 04:35 PM    A-G Ratio 1.5 03/16/2020 04:35 PM    ALT (SGPT) 13 03/16/2020 04:35 PM           Assessment:     Assessment:     Diagnoses and all orders for this visit:    1. PVC (premature ventricular contraction)        ICD-10-CM ICD-9-CM    1. PVC (premature ventricular contraction)  I49.3 427.69      No orders of the defined types were placed in this encounter. Plan:     1. Premature ventricular contractions: Essentially resolved with caffeine reduction, did not require use of beta-blocker. Stable from cardiac standpoint can follow-up as needed if symptomatic PVCs return and requiring use of beta-blocker therapy. Nilsa Puente NP      Please note that this dictation was completed with [employer], the [employer] voice recognition software. Quite often unanticipated grammatical, syntax, homophones, and other interpretive errors are inadvertently transcribed by the computer software. Please disregard these errors. Please excuse any errors that have escaped final proofreading. Thank you.

## 2021-06-04 ENCOUNTER — OFFICE VISIT (OUTPATIENT)
Dept: CARDIOLOGY CLINIC | Age: 31
End: 2021-06-04
Payer: COMMERCIAL

## 2021-06-04 VITALS
RESPIRATION RATE: 16 BRPM | DIASTOLIC BLOOD PRESSURE: 84 MMHG | HEIGHT: 67 IN | HEART RATE: 61 BPM | OXYGEN SATURATION: 99 % | BODY MASS INDEX: 22.29 KG/M2 | WEIGHT: 142 LBS | SYSTOLIC BLOOD PRESSURE: 121 MMHG

## 2021-06-04 DIAGNOSIS — I49.3 PVC (PREMATURE VENTRICULAR CONTRACTION): Primary | ICD-10-CM

## 2021-06-04 PROCEDURE — 93000 ELECTROCARDIOGRAM COMPLETE: CPT | Performed by: NURSE PRACTITIONER

## 2021-06-04 PROCEDURE — 99214 OFFICE O/P EST MOD 30 MIN: CPT | Performed by: NURSE PRACTITIONER

## 2021-06-04 NOTE — PROGRESS NOTES
Identified pt with two pt identifiers(name and ). Reviewed record in preparation for visit and have obtained necessary documentation. No chief complaint on file. Health Maintenance Due   Topic    Hepatitis C Screening     COVID-19 Vaccine (1)    DTaP/Tdap/Td series (1 - Tdap)    PAP AKA CERVICAL CYTOLOGY       There were no vitals taken for this visit. Pain Scale: /10    Coordination of Care Questionnaire:  :   1. Have you been to the ER, urgent care clinic since your last visit? Hospitalized since your last visit? No    2. Have you seen or consulted any other health care providers outside of the [address] [place] [address] since your last visit? Include any pap smears or colon screening.  No

## 2021-07-28 ENCOUNTER — OFFICE VISIT (OUTPATIENT)
Dept: FAMILY MEDICINE CLINIC | Age: 31
End: 2021-07-28
Payer: COMMERCIAL

## 2021-07-28 VITALS
BODY MASS INDEX: 20.83 KG/M2 | TEMPERATURE: 97.4 F | HEART RATE: 88 BPM | SYSTOLIC BLOOD PRESSURE: 121 MMHG | HEIGHT: 67 IN | OXYGEN SATURATION: 99 % | WEIGHT: 132.7 LBS | DIASTOLIC BLOOD PRESSURE: 86 MMHG | RESPIRATION RATE: 16 BRPM
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
## 2021-07-28 DIAGNOSIS — S06.2X1A: ICD-10-CM

## 2021-07-28 DIAGNOSIS — I49.3 PVC (PREMATURE VENTRICULAR CONTRACTION): Primary | ICD-10-CM

## 2021-07-28 PROCEDURE — 99213 OFFICE O/P EST LOW 20 MIN: CPT | Performed by: FAMILY MEDICINE

## 2021-07-28 NOTE — LETTER
NOTIFICATION RETURN TO WORK / SCHOOL        7/28/2021 12:04 PM    Ms. [address] [facility] [phone] [id number]      To Whom It May Concern:    Vargas Lopez is currently under the care of [address] [facility] [facility]. She will be excused from attending work due to the illness from 7/22/2021 and she will be able to return     to work/school on: 08/02/2021    If there are questions or concerns please have the patient contact our office.         Sincerely,      Skyla Fisher MD
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
## 2021-08-03 ENCOUNTER — OFFICE VISIT (OUTPATIENT)
Dept: FAMILY MEDICINE CLINIC | Age: 31
End: 2021-08-03
Payer: COMMERCIAL

## 2021-08-03 VITALS
BODY MASS INDEX: 20.92 KG/M2 | HEIGHT: 67 IN | OXYGEN SATURATION: 99 % | WEIGHT: 133.3 LBS | SYSTOLIC BLOOD PRESSURE: 117 MMHG | RESPIRATION RATE: 18 BRPM | HEART RATE: 84 BPM | DIASTOLIC BLOOD PRESSURE: 80 MMHG

## 2021-08-03 DIAGNOSIS — Z02.89 ENCOUNTER TO OBTAIN EXCUSE FROM WORK: ICD-10-CM

## 2021-08-03 DIAGNOSIS — G44.311 INTRACTABLE ACUTE POST-TRAUMATIC HEADACHE: ICD-10-CM

## 2021-08-03 DIAGNOSIS — I49.3 PVC (PREMATURE VENTRICULAR CONTRACTION): Primary | ICD-10-CM

## 2021-08-03 DIAGNOSIS — Z48.02 ENCOUNTER FOR REMOVAL OF SUTURES: ICD-10-CM

## 2021-08-03 PROCEDURE — 99213 OFFICE O/P EST LOW 20 MIN: CPT | Performed by: FAMILY MEDICINE

## 2021-08-03 NOTE — PROGRESS NOTES
Chief Complaint   Patient presents with    Wound Check     forehead     1. Have you been to the ER, urgent care clinic since your last visit? Hospitalized since your last visit? No    2. Have you seen or consulted any other health care providers outside of the [address] [place] [address] since your last visit? Include any pap smears or colon screening. No     Verified patient with two type of identifiers.  requesting sutures removed

## 2021-08-03 NOTE — LETTER
NOTIFICATION RETURN TO WORK / SCHOOL          8/3/2021 9:47 AM    Ms[address] [id number]      To Whom It May Concern:    Sonia Douglas is currently under the care of [address] [facility]. She will return to work/school on: 8/9/2021    If there are questions or concerns please have the patient contact our office.         Sincerely,      Anam Almaraz MD

## 2021-08-04 NOTE — PROGRESS NOTES
Subjective:   Nba Ellington is a 26 y.o. is here for suture removal.,                     Objective:   Patient appears well. Wound is healing well, without evidence of infection.     Assessment/Plan:   Wound healing well, ready for suture removal.  use triple antibiotic ointment, discussed scarring, discussed safety, recheck PRN, sutures removed, discussed scaring

## 2021-08-04 NOTE — PATIENT INSTRUCTIONS
Laceration: After Your Visit to the Emergency Room  Your Care Instructions  A laceration, or cut, is an open wound through the skin. The doctor has cleaned your wound. The care you need depends on the type of cut or wound you have. The doctor may have used stitches, staples, tape (Steri-strips), or skin glue to close the wound. This will stop the bleeding, help the wound heal, and reduce scarring. Take good care of your wound at home to help it heal quickly and reduce your chance of infection. While your wound is healing, avoid any activity that could cause your wound to reopen. Even though you have been released from the emergency room, you still need to watch for any problems. The doctor carefully checked you. But sometimes problems can develop later. If you have new symptoms, or if your symptoms do not get better, return to the emergency room or call your doctor right away. A visit to the emergency room is only one step in your treatment. Even if you feel better, you still need to do what your doctor recommends, such as going to all suggested follow-up appointments and taking medicines exactly as directed. This will help you recover and help prevent future problems. How can you care for yourself at home? · Keep the wound dry for the first 48 hours or as your doctor tells you. · Clean the area with soap and water 2 times a day unless your doctor gives you different instructions. Don't use hydrogen peroxide or alcohol, which can slow healing. ¨ You may cover the wound with a thin layer of antibiotic ointment, such as bacitracin, and a nonstick bandage. ¨ Apply more ointment and replace the bandage as needed. · If your doctor prescribed antibiotics, take them as directed. Do not stop taking them just because you feel better. You need to take the full course of antibiotics. · Take pain medicines exactly as directed. ¨ If the doctor gave you a prescription medicine for pain, take it as prescribed.   ¨ If you are not taking a prescription pain medicine, ask your doctor if you can take an over-the-counter medicine. · Some pain is normal with a wound, but do not ignore pain that is getting worse instead of better. You could have an infection. · Your doctor may have closed your wound with stitches (sutures), staples, Steri-strips, or skin glue. ¨ If you have stitches, your doctor may remove them after several days to 2 weeks. ¨ If you have Steri-strips, leave them on for a week, or until they loosen and come off on their own. ¨ If you have staples, your doctor may remove them after 7 to 14 days. ¨ If your wound was closed with skin glue, the glue will wear off in a few days to 2 weeks. When should you call for help? Return to the emergency room now if:  · You have signs of infection, such as:  ¨ Increased pain, swelling, warmth, or redness around the wound. ¨ Red streaks leading from the wound. ¨ Pus draining from the wound. ¨ Swollen lymph nodes in your neck, armpits, or groin. ¨ A fever. · The wound opens or starts to bleed, and blood soaks through the bandage. A small amount of blood is normal.  Call your doctor today if:  · The wound has not been getting better or looks worse. Where can you learn more? Go to [employer]be  Enter R096 in the search box to learn more about \"Laceration: After Your Visit to the Emergency Room. \"   © 3333-1724 Healthwise, Incorporated. Care instructions adapted under license by [facility] (which disclaims liability or warranty for this information). This care instruction is for use with your licensed healthcare professional. If you have questions about a medical condition or this instruction, always ask your healthcare professional. [place] [address] any warranty or liability for your use of this information. Content Version: 9.4.04089;  Last Revised: September 29, 2010

## 2021-08-04 NOTE — PROGRESS NOTES
HISTORY OF PRESENT ILLNESS  Mina Erazo is a 26 y.o. female. Morning TERRELL   Started few days Ago, one sided pulsating lasting few hrs, has tried otc not helping, pt states that the HA Worsens by lights and loud noises,   the pain is associated with feeling of  Nausea, and pt hadno voimiting the pain is 7/10 at this time, it occures 2-4 times per wk,    if the HA occurs the pt is unable to do any work while having the HA,     Current Outpatient Medications   Medication Sig Dispense Refill    Sprintec, 28, 0.25-35 mg-mcg tab Take 1 Tab by mouth daily.  atenoloL (TENORMIN) 25 mg tablet TAKE 0.5 TABS BY MOUTH DAILY AS NEEDED (PALPITATION). (Patient not taking: Reported on 7/28/2021) 45 Tab 1     No Known Allergies  Past Medical History:   Diagnosis Date    PVC (premature ventricular contraction) 6/3/2021     Past Surgical History:   Procedure Laterality Date    HX CORNEAL TRANSPLANT      HX GYN  2009    D&C 2009    HX HEENT  2006,2007,2008    corneal transplant (3)  2005     Family History   Problem Relation Age of Onset    Cancer Maternal Grandmother     Cancer Maternal Grandfather      Social History     Tobacco Use    Smoking status: Never Smoker    Smokeless tobacco: Never Used   Substance Use Topics    Alcohol use: Yes     Comment: rarely      Lab Results   Component Value Date/Time    Glucose 79 03/16/2020 04:35 PM    LDL,Direct 118 (H) 04/16/2020 08:04 AM    LDL, calculated 107 (H) 01/10/2014 01:14 PM    Creatinine 0.85 03/16/2020 04:35 PM      Lab Results   Component Value Date/Time    Cholesterol, total 191 03/16/2020 04:35 PM    HDL Cholesterol 80 03/16/2020 04:35 PM    LDL,Direct 118 (H) 04/16/2020 08:04 AM    LDL, calculated 107 (H) 01/10/2014 01:14 PM    Triglyceride 55 04/16/2020 08:04 AM        Review of Systems   Constitutional: Negative for chills, fever and malaise/fatigue. HENT: Negative for nosebleeds. Eyes: Negative for pain. Respiratory: Negative for cough and wheezing. Cardiovascular: Negative for chest pain and leg swelling. Gastrointestinal: Negative for constipation, diarrhea and nausea. Genitourinary: Negative for frequency. Musculoskeletal: Negative for joint pain and myalgias. Skin: Positive for rash. Neurological: Positive for headaches. Negative for loss of consciousness. Endo/Heme/Allergies: Does not bruise/bleed easily. Psychiatric/Behavioral: Negative for depression. The patient is not nervous/anxious and does not have insomnia. All other systems reviewed and are negative. Physical Exam  Vitals and nursing note reviewed. Constitutional:       Appearance: She is well-developed. HENT:      Head: Normocephalic and atraumatic. Eyes:      Conjunctiva/sclera: Conjunctivae normal.      Pupils: Pupils are equal, round, and reactive to light. Neck:      Thyroid: No thyromegaly. Vascular: No JVD. Cardiovascular:      Rate and Rhythm: Normal rate and regular rhythm. Heart sounds: Normal heart sounds. No murmur heard. No friction rub. No gallop. Pulmonary:      Effort: Pulmonary effort is normal. No respiratory distress. Breath sounds: Normal breath sounds. No stridor. No wheezing or rales. Abdominal:      General: Bowel sounds are normal. There is no distension. Palpations: Abdomen is soft. There is no mass. Tenderness: There is no abdominal tenderness. Musculoskeletal:         General: No tenderness. Normal range of motion. Lymphadenopathy:      Cervical: No cervical adenopathy. Skin:     General: Skin is warm. Findings: Bruising, erythema and lesion present. No rash. Neurological:      Mental Status: She is alert and oriented to person, place, and time. Cranial Nerves: No cranial nerve deficit. Deep Tendon Reflexes: Reflexes are normal and symmetric. Psychiatric:         Behavior: Behavior normal.         ASSESSMENT and PLAN  Diagnoses and all orders for this visit:    1.  PVC (premature ventricular contraction)  -     SUTURE / STAPLE REMOVAL BY PROVIDER  -     SUTURE REMOVAL KIT    2. Encounter for removal of sutures  -     SUTURE / STAPLE REMOVAL BY PROVIDER  -     SUTURE REMOVAL KIT    3. Encounter to obtain excuse from work  -     SUTURE / [address] REMOVAL KIT    4.  Intractable acute post-traumatic headache  -     SUTURE / STAPLE REMOVAL BY PROVIDER  -     SUTURE REMOVAL KIT      Secondary to the patient acute medical conditions, a letter on the pt's behalf was completed, pt's multiple questions answered to the best knowledge,  will keep a copy in the chart for further correspondence, patient was informed about the safety and  regulations regarding this condition patient was also advised to follow-up with HR for further guidelines patient acknowledged understanding and agreed with today's recommendations

## 2021-11-02 ENCOUNTER — OFFICE VISIT (OUTPATIENT)
Dept: FAMILY MEDICINE CLINIC | Age: 31
End: 2021-11-02
Payer: COMMERCIAL

## 2021-11-02 VITALS
OXYGEN SATURATION: 99 % | HEIGHT: 67 IN | SYSTOLIC BLOOD PRESSURE: 116 MMHG | RESPIRATION RATE: 16 BRPM | DIASTOLIC BLOOD PRESSURE: 77 MMHG | WEIGHT: 138.2 LBS | BODY MASS INDEX: 21.69 KG/M2 | HEART RATE: 72 BPM

## 2021-11-02 DIAGNOSIS — Z01.818 PREOPERATIVE CLEARANCE: Primary | ICD-10-CM

## 2021-11-02 DIAGNOSIS — Z23 NEEDS FLU SHOT: ICD-10-CM

## 2021-11-02 PROCEDURE — 90686 IIV4 VACC NO PRSV 0.5 ML IM: CPT | Performed by: FAMILY MEDICINE

## 2021-11-02 PROCEDURE — 90471 IMMUNIZATION ADMIN: CPT | Performed by: FAMILY MEDICINE

## 2021-11-02 PROCEDURE — 99213 OFFICE O/P EST LOW 20 MIN: CPT | Performed by: FAMILY MEDICINE

## 2021-11-02 NOTE — LETTER
NOTIFICATION RETURN TO WORK / SCHOOL 
 
 
 
11/2/2021 9:10 AM 
 
Ms. Lyla Braun [address] [id number] To Whom It May Concern: 
 
Lyla Braun is currently under the care of [address] OFFICE-[facility]. She will be excused from work from 11/22/2021 due to illness and she will be able to return to  
 
work/school on: 11/29/2021 If there are questions or concerns please have the patient contact our office.  
 
 
 
Sincerely, 
 
 
Anna Corrigan MD

## 2021-11-02 NOTE — PROGRESS NOTES
Chief Complaint   Patient presents with    Pre-op Exam     breast augmentation      1. Have you been to the ER, urgent care clinic since your last visit? Hospitalized since your last visit? No    2. Have you seen or consulted any other health care providers outside of the [address] [place] [address] since your last visit? Include any pap smears or colon screening. No     Verified patient with two type of identifiers. scheduled for breast augmentation in 3 weeks, did not bring pre op form    After obtaining consent, and per orders of Mily Enriquez, flu vaccine  given to  Left deltoid IM  . Patient instructed to remain in clinic for 15 minutes afterwards, and to report any adverse reaction to me immediately.  Patient did not have any adverse reactions during this office visit

## 2021-11-02 NOTE — PROGRESS NOTES
Preoperative Evaluation    Date of Exam: 2021    Mihai Clement is a 33 y.o. female (:1990) who presents for preoperative evaluation. Today's Covid vaccinated Pt main concerns were provided in the clinic,    pt is w/ comorbid history and unaware if the pt has been exposed to covid-19 individual, Pt has been trying to be very covid Shabby,  pt has no fever no cough no dyspnea, no ha, not dizzy, nl smell nl taste, no N/V/D, has no orbital pain,  no body ache. 2021,        who presents for preoperative evaluation with     current medical hx of pt has been in a stable and pleasant conditions for many yrs w/out psychotic behavior,and stating that the current meds not helping the abdominal pain and spotting  any more and seeking alternative surgical correction for her current condition at this time, patient PVC has been stable denies any cough shortness of breath having no sore throat use her short acting bronchodilator as needed last 2 weeks has been using it only couple times, patient also been vaccinated with pneumonia and Tdap recently     functioning is capable of doing10 blocks of walking w/out getting shortness of breath, at this time there is no assistive devices used physically  Patient with a good social support which include living at home, she is self cared, and her other primary care giver is her  at home, no recent major trauma, does not have any history of blood clots,  on no blood thinner no hx of abnormal bleeding or easy bruisabiltity.   Denies any recent surgical procedure  and currently not taking any herbal supplements, nor any illicit drugs,current status of the pt is stable except for half a pack of cig smoking ,     Latex Allergy: no    Medical History:     Past Medical History:   Diagnosis Date    PVC (premature ventricular contraction) 6/3/2021     Allergies:   No Known Allergies   Medications:     Current Outpatient Medications   Medication Sig    atenoloL (TENORMIN) 25 mg tablet TAKE 0.5 TABS BY MOUTH DAILY AS NEEDED (PALPITATION). (Patient not taking: Reported on 7/28/2021)   [address], 0.25-35 mg-mcg tab Take 1 Tab by mouth daily. (Patient not taking: Reported on 11/2/2021)     No current facility-administered medications for this visit. Surgical History:     Past Surgical History:   Procedure Laterality Date    HX CORNEAL TRANSPLANT      HX GYN  2009    D&C 2009    HX HEENT  2006,2007,2008    corneal transplant (3)  2005     Social History:     Social History     Socioeconomic History    Marital status:      Spouse name: Not on file    Number of children: Not on file    Years of education: Not on file    Highest education level: Not on file   Tobacco Use    Smoking status: Never Smoker    Smokeless tobacco: Never Used   Vaping Use    Vaping Use: Never used   Substance and Sexual Activity    Alcohol use: Yes     Comment: rarely    Drug use: No    Sexual activity: Yes     Partners: Male     Birth control/protection: Pill     Social Determinants of Health     Financial Resource Strain:     Difficulty of Paying Living Expenses:    Food Insecurity:     Worried About Running Out of Food in the Last Year:     Ran Out of Food in the Last Year:    Transportation Needs:     Lack of Transportation (Medical):      Lack of Transportation (Non-Medical):    Physical Activity:     Days of Exercise per Week:     Minutes of Exercise per Session:    Stress:     Feeling of Stress :    Social Connections:     Frequency of Communication with Friends and Family:     Frequency of Social Gatherings with Friends and Family:     Attends Yazdanism Services:     Active Member of Clubs or Organizations:     Attends Club or Organization Meetings:     Marital Status:        Anesthesia Complications: None  History of abnormal bleeding : None  History of Blood Transfusions: no  Health Care Directive or Living Will: no    Objective:       Constitutional: no chills and fever, obese, nad     HENT: no ear head pain and nosebleeds. No blurred vision, pain and discharge. Respiratory: no shortness of breath, wheezing cough nor sore throat. Cardiovascular: Has no chest pain, leg swelling ,and racing heart . Gastrointestinal: No constipation, diarrhea, nausea and vomiting. Genitourinary: No frequency. Musculoskeletal: Negative for joint pain. Skin: no itching, pimples or acne rash. Neurological: Negative for dizziness, no tremors  Psychiatric/Behavioral: Negative for depression has normal interest to do things and not depressed the patient is not nervous/anxious. General: Patient alert cooperative appears stated for the age  [de-identified]; normocephalic and atraumatic PERRLA extraocular motor intact normal tympanic membrane normal external ear bilaterally normal sinuses with mucosal normal no drainage  Neck: supple no adenopathy no JVD no bruit  Lungs: Clear to auscultation bilaterally no rales rhonchi or wheezes  Heart: Normal regular S1-S2 no gallops rubs or clicks no murmur  Breast exam deferred  Abdomen: Soft nontender normal bowel sounds no organomegaly  Extremities: Normal range of motion no point tenderness normal pulses no edema  Pelvic/: No lesion, no lymphadenopathy  Skin: Normal no lesion no rash    DIAGNOSTICS:   1. EKG: EKG FINDINGS - not indicatd      2. CXR: not indicatd  3.  Labs:   Lab Results   Component Value Date/Time    WBC 7.0 03/16/2020 04:35 PM    HGB 13.6 03/16/2020 04:35 PM    HCT 40.3 03/16/2020 04:35 PM    PLATELET 068 93/63/3881 04:35 PM    MCV 88 03/16/2020 04:35 PM       IMPRESSION:   Low risk for planned surgery  No contraindications to planned surgery    Rhonda Moreno MD   11/2/2021

## 2021-11-02 NOTE — PATIENT INSTRUCTIONS
Vaccine Information Statement Influenza (Flu) Vaccine (Inactivated or Recombinant): What You Need to Know Many vaccine information statements are available in [place] and other languages. See www.immunize.org/vis. Hojas de información sobre vacunas están disponibles en español y en muchos otros idiomas. Visite www.immunize.org/vis. 1. Why get vaccinated? Influenza vaccine can prevent influenza (flu). Flu is a contagious disease that spreads around the [facility] every year, usually between October and May. Anyone can get the flu, but it is more dangerous for some people. Infants and young children, people 72 years and older, pregnant people, and people with certain health conditions or a weakened immune system are at greatest risk of flu complications. Pneumonia, bronchitis, sinus infections, and ear infections are examples of flu-related complications. If you have a medical condition, such as heart disease, cancer, or diabetes, flu can make it worse. Flu can cause fever and chills, sore throat, muscle aches, fatigue, cough, headache, and runny or stuffy nose. Some people may have vomiting and diarrhea, though this is more common in children than adults. In an average year, thousands of people in the [facility] die from flu, and many more are hospitalized. Flu vaccine prevents millions of illnesses and flu-related visits to the doctor each year. 2. Influenza vaccines CDC recommends everyone 6 months and older get vaccinated every flu season. Children 6 months through 6years of age may need 2 doses during a single flu season. Everyone else needs only 1 dose each flu season. It takes about 2 weeks for protection to develop after vaccination. There are many flu viruses, and they are always changing. Each year a new flu vaccine is made to protect against the influenza viruses believed to be likely to cause disease in the upcoming flu season.  Even when the vaccine doesnt exactly match these viruses, it may still provide some protection. Influenza vaccine does not cause flu. Influenza vaccine may be given at the same time as other vaccines. 3. Talk with your health care provider Tell your vaccination provider if the person getting the vaccine: 
 Has had an allergic reaction after a previous dose of influenza vaccine, or has any severe, life-threatening allergies  Has ever had Guillain-Barré Syndrome (also called GBS) In some cases, your health care provider may decide to postpone influenza vaccination until a future visit. Influenza vaccine can be administered at any time during pregnancy. People who are or will be pregnant during influenza season should receive inactivated influenza vaccine. People with minor illnesses, such as a cold, may be vaccinated. People who are moderately or severely ill should usually wait until they recover before getting influenza vaccine. Your health care provider can give you more information. 4. Risks of a vaccine reaction  Soreness, redness, and swelling where the shot is given, fever, muscle aches, and headache can happen after influenza vaccination.  There may be a very small increased risk of Guillain-Barré Syndrome (GBS) after inactivated influenza vaccine (the flu shot). Stephanie Loza children who get the flu shot along with pneumococcal vaccine (PCV13) and/or DTaP vaccine at the same time might be slightly more likely to have a seizure caused by fever. Tell your health care provider if a child who is getting flu vaccine has ever had a seizure. People sometimes faint after medical procedures, including vaccination. Tell your provider if you feel dizzy or have vision changes or ringing in the ears. As with any medicine, there is a very remote chance of a vaccine causing a severe allergic reaction, other serious injury, or death. 5. What if there is a serious problem?  
 
An allergic reaction could occur after the vaccinated person leaves the clinic. If you see signs of a severe allergic reaction (hives, swelling of the face and throat, difficulty breathing, a fast heartbeat, dizziness, or weakness), call 9-1-1 and get the person to the nearest hospital. 
 
For other signs that concern you, call your health care provider. Adverse reactions should be reported to the Vaccine Adverse Event Reporting System (VAERS). Your health care provider will usually file this report, or you can do it yourself. Visit the VAERS website at www.vaers. [facility].gov or call 1-398.832.5918. VAERS is only for reporting reactions, and VAERS staff members do not give medical advice. 6. The National Vaccine Injury Compensation Program 
 
The [facility] Vaccine Injury Compensation Program (VICP) is a federal program that was created to compensate people who may have been injured by certain vaccines. Claims regarding alleged injury or death due to vaccination have a time limit for filing, which may be as short as two years. Visit the VICP website at www.[facility].gov/vaccinecompensation or call 3-804.537.1975 to learn about the program and about filing a claim. 7. How can I learn more?  Ask your health care provider.  Call your local or state health department.  Visit the website of the Food and Drug Administration (FDA) for vaccine package inserts and additional information at www.fda.gov/vaccines-blood-biologics/vaccines.  Contact the Centers for Disease Control and Prevention (CDC): 
- Call 0-542.635.2575 (1-800-CDC-INFO) or 
- Visit CDCs influenza website at www.cdc.gov/flu. Vaccine Information Statement Inactivated Influenza Vaccine 8/6/2021 
42 JESUS Arizmendi [id number] Department of "[employer] and [place] Centers for Disease Control and Prevention Office Use Only Learning About What to Expect at Home After Surgery What do you need to know when you leave the hospital after surgery?  
  
Each person recovers from surgery at a different pace. Your discharge plan will help you leave the hospital safely. It will outline the care you need. And it will give you information about the things you'll need to do at home. Make sure you get your plan in writing. Look for information on: · What your medicines are and how to take them. · When you need to see the doctor again or get any follow-up tests. · How and when to change bandages and dressings. · How active you can be. This may include physical therapy. · How to prevent falls. · What you can eat and can't eat. What do you need to know about taking medicines? Your doctor will talk with you about restarting your medicines. He or she will also tell you about taking any new medicines. · If you take aspirin or some other blood thinner, be sure to talk to your doctor. He or she will tell you if and when to start taking those medicines again. · If your doctor gave you a prescription medicine for pain, take it as prescribed. · If you aren't taking a prescription pain medicine, ask your doctor if you can take an over-the-counter medicine. How can you take care of your incision? If you have a cut (incision), follow your doctor's instructions. If you didn't get instructions, follow this general advice: · You'll have a dressing over the cut. This helps the incision heal and protects it. ? Change the bandage daily. ? If you have strips of tape on the cut, leave them on for a week or until they fall off. 
? If you have stitches or staples, your doctor will tell you when to return to have them removed. ? If you have skin glue (liquid stitches), leave it on until it falls off. · Wash the area daily with warm water, and pat it dry. Don't use hydrogen peroxide or alcohol. · You may shower 24 to 48 hours after surgery. Before showering, cover the dressing with a plastic bag or use another method of keeping it dry. Pat the incision dry if it gets damp.  Don't swim or take a bath until your doctor tells you it's okay. When can you be active again? One of the most important things you can do for yourself after surgery is to find ways to move. When you move as much as possible, even in bed, you are helping your body heal. 
Here are some tips: 
· Don't move quickly or lift anything heavy until you feel better. · Taking short walks is a good way to help your body heal. 
· Rest when you feel tired. Your doctor may give you instructions on when you can do your normal activities again, such as driving and going back to work. What do you need to know about eating? If your doctor told you when you can start eating and what foods you can eat, follow your doctor's instructions. If you did not get instructions, follow this general advice: · You can eat your normal diet when you feel well. If your stomach is upset, try bland, low-fat foods. These include plain rice, broiled chicken, toast, and yogurt. · If your bowel movements aren't regular right after surgery, try to avoid constipation and straining. Drink plenty of water. Your doctor may suggest fiber, a stool softener, or a mild laxative. What do you do if you have infection or pain? If you have signs of infection, call your doctor. These signs include: 
· Increased pain, swelling, warmth, or redness. · Red streaks leading from the incision. · Pus draining from the incision. · A fever. Also call your doctor if you have pain that does not get better after you take pain medicine. Follow-up care is a key part of your treatment and safety. Be sure to make and go to all appointments, and call your doctor if you are having problems. It's also a good idea to know your test results and keep a list of the medicines you take. Where can you learn more? Go to http://www.gray.com/ Gene Ledezma in the search box to learn more about \"Learning About What to Expect at Home After Surgery. \" Current as of: February 11, 2021               Content Version: 13.0 © 2164-7518 [employer]. Care instructions adapted under license by [employer] (which disclaims liability or warranty for this information). If you have questions about a medical condition or this instruction, always ask your healthcare professional. [address] any warranty or liability for your use of this information.